# Patient Record
Sex: MALE | Race: WHITE | Employment: OTHER | URBAN - METROPOLITAN AREA
[De-identification: names, ages, dates, MRNs, and addresses within clinical notes are randomized per-mention and may not be internally consistent; named-entity substitution may affect disease eponyms.]

---

## 2019-11-04 ENCOUNTER — HOSPITAL ENCOUNTER (OUTPATIENT)
Dept: GENERAL RADIOLOGY | Age: 79
Discharge: HOME | End: 2019-11-04
Payer: MEDICARE

## 2019-11-04 ENCOUNTER — HOSPITAL ENCOUNTER (OUTPATIENT)
Age: 79
Discharge: HOME | End: 2019-11-04
Payer: MEDICARE

## 2019-11-04 DIAGNOSIS — S62.341A CLOSED NONDISPLACED FRACTURE OF BASE OF SECOND METACARPAL BONE OF LEFT HAND, INITIAL ENCOUNTER: ICD-10-CM

## 2019-11-04 PROCEDURE — 73130 X-RAY EXAM OF HAND: CPT

## 2022-10-26 ENCOUNTER — HOSPITAL ENCOUNTER (EMERGENCY)
Age: 82
Discharge: HOME | End: 2022-10-26
Attending: EMERGENCY MEDICINE
Payer: MEDICARE

## 2022-10-26 VITALS
SYSTOLIC BLOOD PRESSURE: 152 MMHG | BODY MASS INDEX: 27.92 KG/M2 | HEIGHT: 70 IN | OXYGEN SATURATION: 97 % | RESPIRATION RATE: 16 BRPM | HEART RATE: 72 BPM | WEIGHT: 195 LBS | DIASTOLIC BLOOD PRESSURE: 79 MMHG

## 2022-10-26 DIAGNOSIS — S05.01XA ABRASION OF RIGHT CORNEA, INITIAL ENCOUNTER: Primary | ICD-10-CM

## 2022-10-26 PROCEDURE — 99283 EMERGENCY DEPT VISIT LOW MDM: CPT

## 2022-10-26 PROCEDURE — 6370000000 HC RX 637 (ALT 250 FOR IP): Performed by: EMERGENCY MEDICINE

## 2022-10-26 RX ORDER — OMEGA-3S/DHA/EPA/FISH OIL/D3 300MG-1000
1000 CAPSULE ORAL DAILY
COMMUNITY

## 2022-10-26 RX ORDER — LISINOPRIL 20 MG/1
TABLET ORAL
COMMUNITY

## 2022-10-26 RX ORDER — AMLODIPINE BESYLATE 10 MG/1
10 TABLET ORAL DAILY
COMMUNITY
Start: 2020-09-11

## 2022-10-26 RX ORDER — GLIPIZIDE 10 MG/1
TABLET ORAL
COMMUNITY
Start: 2022-10-24

## 2022-10-26 RX ORDER — ALBUTEROL SULFATE 90 UG/1
AEROSOL, METERED RESPIRATORY (INHALATION)
COMMUNITY

## 2022-10-26 RX ORDER — TETRACAINE HYDROCHLORIDE 5 MG/ML
2 SOLUTION OPHTHALMIC ONCE
Status: COMPLETED | OUTPATIENT
Start: 2022-10-26 | End: 2022-10-26

## 2022-10-26 RX ORDER — SIMVASTATIN 20 MG
TABLET ORAL
COMMUNITY

## 2022-10-26 RX ORDER — FUROSEMIDE 40 MG/1
TABLET ORAL
COMMUNITY
Start: 2022-09-03

## 2022-10-26 RX ORDER — VALSARTAN 160 MG/1
TABLET ORAL
COMMUNITY

## 2022-10-26 RX ORDER — ASPIRIN 81 MG/1
TABLET ORAL
COMMUNITY

## 2022-10-26 RX ORDER — SOTALOL HYDROCHLORIDE 80 MG/1
TABLET ORAL
COMMUNITY

## 2022-10-26 RX ORDER — WARFARIN SODIUM 5 MG/1
TABLET ORAL
COMMUNITY
Start: 2021-10-07

## 2022-10-26 RX ORDER — NITROGLYCERIN 0.4 MG/1
0.4 TABLET SUBLINGUAL PRN
COMMUNITY
Start: 2018-07-17

## 2022-10-26 RX ORDER — FLUTICASONE PROPIONATE 50 MCG
SPRAY, SUSPENSION (ML) NASAL
COMMUNITY

## 2022-10-26 RX ORDER — FUROSEMIDE 40 MG/1
TABLET ORAL
COMMUNITY
Start: 2021-09-20

## 2022-10-26 RX ORDER — GABAPENTIN 100 MG/1
CAPSULE ORAL
COMMUNITY

## 2022-10-26 RX ORDER — METFORMIN HYDROCHLORIDE 500 MG/1
1000 TABLET, EXTENDED RELEASE ORAL
COMMUNITY

## 2022-10-26 RX ORDER — ERYTHROMYCIN 5 MG/G
OINTMENT OPHTHALMIC EVERY 6 HOURS
Qty: 1 G | Refills: 0 | Status: SHIPPED | OUTPATIENT
Start: 2022-10-26 | End: 2022-10-31

## 2022-10-26 RX ADMIN — TETRACAINE HYDROCHLORIDE 2 DROP: 5 SOLUTION OPHTHALMIC at 10:43

## 2022-10-26 NOTE — ED PROVIDER NOTES
Triage Chief Complaint:   Foreign Body (Rt eye from blowing leaves x 2 days ago)      [place]:  Mishel Brownlee is a 78 y.o. male that presents to the emergency department with concerns for foreign body in his right eye. He states that 2 days ago he was raking leaves and blowing leaves when he believes that something got into his right eye. He wears glasses but does not wear contacts. He denies blurry vision. He states his right eye is watering at times. He was able to drive himself here. He states it feels like every time he closes his eye there is something in there. .    Past Medical History:   Diagnosis Date    Diabetes mellitus ([facility] [address]     Hyperlipidemia     Hypertension      No past surgical history on file. No family history on file.   Social History     Socioeconomic History    Marital status:      Spouse name: Not on file    Number of children: Not on file    Years of education: Not on file    Highest education level: Not on file   Occupational History    Not on file   Tobacco Use    Smoking status: Never    Smokeless tobacco: Not on file   Substance and Sexual Activity    Alcohol use: Not Currently    Drug use: Not on file    Sexual activity: Not on file   Other Topics Concern    Not on file   Social History Narrative    Not on file     Social Determinants of Health     Financial Resource Strain: Not on file   Food Insecurity: Not on file   Transportation Needs: Not on file   Physical Activity: Not on file   Stress: Not on file   Social Connections: Not on file   Intimate Partner Violence: Not on file   Housing Stability: Not on file     Current Facility-Administered Medications   Medication Dose Route Frequency Provider Last Rate Last Admin    tetracaine (TETRAVISC) 0.5 % ophthalmic solution 2 drop  2 drop Left Eye Once Kirt Wilhelm MD        fluorescein ophthalmic strip 1 mg  1 strip Left Eye Once Kirt Wilhelm MD         Current Outpatient Medications   Medication Sig Dispense Refill amLODIPine (NORVASC) 10 MG tablet Take 10 mg by mouth daily      furosemide (LASIX) 40 MG tablet furosemide 40 mg tablet   TAKE 1 TABLET BY MOUTH EVERY DAY      glipiZIDE (GLUCOTROL) 10 MG tablet glipizide 10 mg tablet   Take 0.5 tablets every day by oral route. nitroGLYCERIN (NITROSTAT) 0.4 MG SL tablet Place 0.4 mg under the tongue as needed      warfarin (COUMADIN) 5 MG tablet warfarin 5 mg tablet   Sunday, tues, thur, saturday      erythromycin (ROMYCIN) 5 MG/GM ophthalmic ointment Place into the right eye every 6 hours for 5 days 1 g 0    CPAP Machine MISC nightly      Multiple Vitamins-Minerals (MULTIVITAMIN ADULT EXTRA C PO) multivitamin   TAKE ONE TABLET BY MOUTH DAILY      albuterol sulfate HFA (VENTOLIN HFA) 108 (90 Base) MCG/ACT inhaler Ventolin HFA 90 mcg/actuation aerosol inhaler      aspirin 81 MG EC tablet aspirin 81 mg tablet,delayed release   Take 1 tablet every day by oral route. vitamin D3 (CHOLECALCIFEROL) 10 MCG (400 UNIT) TABS tablet Take 1,000 Units by mouth daily      cyanocobalamin 1000 MCG tablet cyanocobalamin (vit B-12) 1,000 mcg tablet   Take 1 tablet every day by oral route. empagliflozin (JARDIANCE) 10 MG tablet Jardiance 10 mg tablet   Take 1 tablet every day by oral route for 90 days. fluticasone (FLONASE) 50 MCG/ACT nasal spray fluticasone propionate 50 mcg/actuation nasal spray,suspension      furosemide (LASIX) 40 MG tablet TAKE 1 TABLET BY MOUTH EVERY DAY      gabapentin (NEURONTIN) 100 MG capsule gabapentin 100 mg capsule   TAKE 2 CAPSULES EVERY DAY BY ORAL ROUTE IN THE MORNING FOR 90 DAYS.       lisinopril (PRINIVIL;ZESTRIL) 20 MG tablet lisinopril 20 mg tablet      metFORMIN (GLUCOPHAGE-XR) 500 MG extended release tablet Take 1,000 mg by mouth every morning (before breakfast)      simvastatin (ZOCOR) 20 MG tablet simvastatin 20 mg tablet   TAKE 1 TABLET 3 TIMES A WEEK BY ORAL ROUTE FOR 90 DAYS      sotalol (BETAPACE) 80 MG tablet sotalol 80 mg tablet valsartan (DIOVAN) 160 MG tablet valsartan 160 mg tablet   Take 1 tablet every day by oral route. No Known Allergies  Nursing Notes Reviewed    ROS:  At least 5 systems reviewed and otherwise negative except as in the [address] Physical Exam:  ED Triage Vitals [10/26/22 1022]   Enc Vitals Group      BP (!) 152/79      Heart Rate 72      Resp 16      Temp       Temp Source Infrared      SpO2 97 %      Weight 195 lb (88.5 kg)      Height 5' 10\" (1.778 m)      Head Circumference       Peak Flow       Pain Score       Pain Loc       Pain Edu? Excl. in [address]? My pulse oximetry interpretation is which is within the normal range    GENERAL APPEARANCE: Awake and alert. Cooperative. No acute distress. HEAD:  Atraumatic. EYES: EOM's grossly intact. Peoples equal round reactive to light. No hyphema. No corneal lacerations. Under fluorescein exam approximate 1 mm corneal abrasion at the 7 o'clock position. ENT: Mucous membranes are moist.  No trismus. NECK:  Trachea midline. EXTREMITIES: No acute deformities. SKIN: Warm and dry. NEUROLOGICAL: No gross facial drooping. Moves all 4 extremities spontaneously. PSYCHIATRIC: Normal mood. I have reviewed and interpreted all of the currently available lab results from this visit (if applicable):  No results found for this visit on 10/26/22. EKG: (All EKG's are interpreted by myself in the absence of a cardiologist)      MDM:  Patient appears to have a superficial corneal abrasion in his right affected eye. His vision is intact. I will start him on erythromycin ointment. He is instructed to use Tylenol over-the-counter as needed if tolerated. Instructed to follow-up with PCP as needed. Given strict return precautions. Clinical Impression:  1.  Abrasion of right cornea, initial encounter        Disposition Vitals:  [unfilled], [unfilled], [unfilled], [unfilled]    Disposition referral (if applicable):  [facility] [id number]  [phone]    Schedule an appointment as soon as possible for a visit   If symptoms worsen    Disposition medications (if applicable):  New Prescriptions    ERYTHROMYCIN (ROMYCIN) 5 MG/GM OPHTHALMIC OINTMENT    Place into the right eye every 6 hours for 5 days         (Please note that portions of this note may have been completed with a voice recognition program. Efforts were made to edit the dictations but occasionally words are mis-transcribed.)    Lucile Gosselin, MD Daisey Stade, MD  10/26/22 3912

## 2022-10-26 NOTE — ED NOTES
Instructions given how to instill ointment into rt eye, pt verbalized understanding.       Carlos Jacobs RN  10/26/22 [id number]

## 2023-04-18 ENCOUNTER — HOSPITAL ENCOUNTER (EMERGENCY)
Age: 83
Discharge: HOME | End: 2023-04-18
Attending: EMERGENCY MEDICINE
Payer: MEDICARE

## 2023-04-18 ENCOUNTER — APPOINTMENT (OUTPATIENT)
Dept: GENERAL RADIOLOGY | Age: 83
End: 2023-04-18
Payer: MEDICARE

## 2023-04-18 VITALS
TEMPERATURE: 97 F | SYSTOLIC BLOOD PRESSURE: 166 MMHG | WEIGHT: 217 LBS | RESPIRATION RATE: 22 BRPM | BODY MASS INDEX: 31.07 KG/M2 | DIASTOLIC BLOOD PRESSURE: 88 MMHG | HEIGHT: 70 IN | OXYGEN SATURATION: 91 % | HEART RATE: 77 BPM

## 2023-04-18 DIAGNOSIS — J40 BRONCHITIS: Primary | ICD-10-CM

## 2023-04-18 LAB
ALBUMIN SERPL-MCNC: 4.1 GM/DL (ref 3.4–5)
ALP BLD-CCNC: 95 IU/L (ref 40–129)
ALT SERPL-CCNC: 9 U/L (ref 10–40)
ANION GAP SERPL CALCULATED.3IONS-SCNC: 9 MMOL/L (ref 4–16)
AST SERPL-CCNC: 17 IU/L (ref 15–37)
BASOPHILS ABSOLUTE: 0 K/CU MM
BASOPHILS RELATIVE PERCENT: 0.3 % (ref 0–1)
BILIRUB SERPL-MCNC: 0.6 MG/DL (ref 0–1)
BUN SERPL-MCNC: 25 MG/DL (ref 6–23)
CALCIUM SERPL-MCNC: 9.4 MG/DL (ref 8.3–10.6)
CHLORIDE BLD-SCNC: 107 MMOL/L (ref 99–110)
CO2: 28 MMOL/L (ref 21–32)
CREAT SERPL-MCNC: 0.8 MG/DL (ref 0.9–1.3)
DIFFERENTIAL TYPE: ABNORMAL
EOSINOPHILS ABSOLUTE: 0.5 K/CU MM
EOSINOPHILS RELATIVE PERCENT: 6.4 % (ref 0–3)
GFR SERPL CREATININE-BSD FRML MDRD: >60 ML/MIN/1.73M2
GLUCOSE SERPL-MCNC: 166 MG/DL (ref 70–99)
HCT VFR BLD CALC: 40.3 % (ref 42–52)
HEMOGLOBIN: 12.6 GM/DL (ref 13.5–18)
IMMATURE NEUTROPHIL %: 0.4 % (ref 0–0.43)
LYMPHOCYTES ABSOLUTE: 0.8 K/CU MM
LYMPHOCYTES RELATIVE PERCENT: 9.6 % (ref 24–44)
MCH RBC QN AUTO: 30.4 PG (ref 27–31)
MCHC RBC AUTO-ENTMCNC: 31.3 % (ref 32–36)
MCV RBC AUTO: 97.1 FL (ref 78–100)
MONOCYTES ABSOLUTE: 0.7 K/CU MM
MONOCYTES RELATIVE PERCENT: 9 % (ref 0–4)
PDW BLD-RTO: 13.5 % (ref 11.7–14.9)
PLATELET # BLD: 151 K/CU MM (ref 140–440)
PMV BLD AUTO: 10.7 FL (ref 7.5–11.1)
POTASSIUM SERPL-SCNC: 3.9 MMOL/L (ref 3.5–5.1)
PRO-BNP: 2709 PG/ML
RBC # BLD: 4.15 M/CU MM (ref 4.6–6.2)
SARS-COV-2 RDRP RESP QL NAA+PROBE: NOT DETECTED
SEGMENTED NEUTROPHILS ABSOLUTE COUNT: 5.9 K/CU MM
SEGMENTED NEUTROPHILS RELATIVE PERCENT: 74.3 % (ref 36–66)
SODIUM BLD-SCNC: 144 MMOL/L (ref 135–145)
SOURCE: NORMAL
TOTAL IMMATURE NEUTOROPHIL: 0.03 K/CU MM
TOTAL PROTEIN: 7 GM/DL (ref 6.4–8.2)
TROPONIN T: <0.01 NG/ML
WBC # BLD: 7.9 K/CU MM (ref 4–10.5)

## 2023-04-18 PROCEDURE — 84484 ASSAY OF TROPONIN QUANT: CPT

## 2023-04-18 PROCEDURE — 85025 COMPLETE CBC W/AUTO DIFF WBC: CPT

## 2023-04-18 PROCEDURE — 80053 COMPREHEN METABOLIC PANEL: CPT

## 2023-04-18 PROCEDURE — 99285 EMERGENCY DEPT VISIT HI MDM: CPT

## 2023-04-18 PROCEDURE — 93005 ELECTROCARDIOGRAM TRACING: CPT | Performed by: EMERGENCY MEDICINE

## 2023-04-18 PROCEDURE — 87635 SARS-COV-2 COVID-19 AMP PRB: CPT

## 2023-04-18 PROCEDURE — 71045 X-RAY EXAM CHEST 1 VIEW: CPT

## 2023-04-18 PROCEDURE — 83880 ASSAY OF NATRIURETIC PEPTIDE: CPT

## 2023-04-18 PROCEDURE — 6370000000 HC RX 637 (ALT 250 FOR IP): Performed by: EMERGENCY MEDICINE

## 2023-04-18 RX ORDER — PREDNISONE 10 MG/1
40 TABLET ORAL DAILY
Qty: 16 TABLET | Refills: 0 | Status: SHIPPED | OUTPATIENT
Start: 2023-04-19 | End: 2023-04-23

## 2023-04-18 RX ORDER — PREDNISONE 20 MG/1
60 TABLET ORAL ONCE
Status: COMPLETED | OUTPATIENT
Start: 2023-04-18 | End: 2023-04-18

## 2023-04-18 RX ORDER — ALBUTEROL SULFATE 90 UG/1
2 AEROSOL, METERED RESPIRATORY (INHALATION) ONCE
Status: COMPLETED | OUTPATIENT
Start: 2023-04-18 | End: 2023-04-18

## 2023-04-18 RX ORDER — ALBUTEROL SULFATE 90 UG/1
2 AEROSOL, METERED RESPIRATORY (INHALATION) 4 TIMES DAILY PRN
Qty: 54 G | Refills: 1 | Status: SHIPPED | OUTPATIENT
Start: 2023-04-18

## 2023-04-18 RX ADMIN — ALBUTEROL SULFATE 2 PUFF: 90 AEROSOL, METERED RESPIRATORY (INHALATION) at 11:32

## 2023-04-18 RX ADMIN — PREDNISONE 60 MG: 20 TABLET ORAL at 11:32

## 2023-04-18 ASSESSMENT — PAIN - FUNCTIONAL ASSESSMENT: PAIN_FUNCTIONAL_ASSESSMENT: NONE - DENIES PAIN

## 2023-04-18 NOTE — ED PROVIDER NOTES
Emergency Department Encounter    Patient: Shereen Hsieh  MRN: [id number]  : 1940  Date of Evaluation: 2023  ED Provider:  Marvin Leyva MD    Triage Chief Complaint:   Cough (Reports 2 days reports pharyngitis), Wheezing, and Shortness of Breath (Reports on exertion.)    [place]:  Shereen Hsieh is a 78 y.o. male that presents with complaint of cough and wheezing, shortness of breath is primarily with exertion today. Started with sore throat and congestion on  and has progressed over the last 2 days. No chest pain. No abdominal pain. No vomiting or diarrhea. He has used inhaler when he was been sick a long time ago when he had pneumonia, otherwise does not use inhalers. He has not required steroids frequently in the past.  He does not have any history of COPD or lung disease that he is aware of. He is a history of hyperlipidemia, hypertension and diabetes. No previous known cardiac disease. No leg swelling. ROS - see HPI, below listed is current ROS at time of my eval:  10 systems reviewed and negative except as above. Past Medical History:   Diagnosis Date    Diabetes mellitus ([facility] [address]     Hyperlipidemia     Hypertension      Past Surgical History:   Procedure Laterality Date    HERNIA REPAIR       History reviewed. No pertinent family history.   Social History     Socioeconomic History    Marital status:      Spouse name: Not on file    Number of children: Not on file    Years of education: Not on file    Highest education level: Not on file   Occupational History    Not on file   Tobacco Use    Smoking status: Never    Smokeless tobacco: Not on file   Substance and Sexual Activity    Alcohol use: Not Currently    Drug use: Never    Sexual activity: Not on file   Other Topics Concern    Not on file   Social History Narrative    Not on file     Social Determinants of Health     Financial Resource Strain: Not on file   Food Insecurity: Not on file   Transportation Needs: Not

## 2023-04-19 LAB
EKG ATRIAL RATE: 357 BPM
EKG DIAGNOSIS: NORMAL
EKG Q-T INTERVAL: 454 MS
EKG QRS DURATION: 170 MS
EKG QTC CALCULATION (BAZETT): 497 MS
EKG R AXIS: -66 DEGREES
EKG T AXIS: 108 DEGREES
EKG VENTRICULAR RATE: 72 BPM

## 2023-04-19 PROCEDURE — 93010 ELECTROCARDIOGRAM REPORT: CPT | Performed by: INTERNAL MEDICINE

## 2023-04-24 ENCOUNTER — HOSPITAL ENCOUNTER (EMERGENCY)
Age: 83
Discharge: OTHER INSTITUTION | End: 2023-04-24
Attending: EMERGENCY MEDICINE
Payer: MEDICARE

## 2023-04-24 ENCOUNTER — APPOINTMENT (OUTPATIENT)
Dept: GENERAL RADIOLOGY | Age: 83
End: 2023-04-24
Payer: MEDICARE

## 2023-04-24 VITALS
HEART RATE: 92 BPM | RESPIRATION RATE: 20 BRPM | DIASTOLIC BLOOD PRESSURE: 76 MMHG | SYSTOLIC BLOOD PRESSURE: 160 MMHG | TEMPERATURE: 98 F | OXYGEN SATURATION: 89 % | HEIGHT: 70 IN | BODY MASS INDEX: 31.07 KG/M2 | WEIGHT: 217 LBS

## 2023-04-24 DIAGNOSIS — J18.9 PNEUMONIA OF BOTH LOWER LOBES DUE TO INFECTIOUS ORGANISM: ICD-10-CM

## 2023-04-24 DIAGNOSIS — I50.9 ACUTE CONGESTIVE HEART FAILURE, UNSPECIFIED HEART FAILURE TYPE (HCC): Primary | ICD-10-CM

## 2023-04-24 LAB
ALBUMIN SERPL-MCNC: 3.6 GM/DL (ref 3.4–5)
ALP BLD-CCNC: 79 IU/L (ref 40–129)
ALT SERPL-CCNC: 21 U/L (ref 10–40)
ANION GAP SERPL CALCULATED.3IONS-SCNC: 7 MMOL/L (ref 4–16)
AST SERPL-CCNC: 14 IU/L (ref 15–37)
BASOPHILS ABSOLUTE: 0 K/CU MM
BASOPHILS RELATIVE PERCENT: 0.1 % (ref 0–1)
BILIRUB SERPL-MCNC: 0.8 MG/DL (ref 0–1)
BUN SERPL-MCNC: 31 MG/DL (ref 6–23)
CALCIUM SERPL-MCNC: 9 MG/DL (ref 8.3–10.6)
CHLORIDE BLD-SCNC: 104 MMOL/L (ref 99–110)
CO2: 32 MMOL/L (ref 21–32)
CREAT SERPL-MCNC: 1 MG/DL (ref 0.9–1.3)
DIFFERENTIAL TYPE: ABNORMAL
EOSINOPHILS ABSOLUTE: 0 K/CU MM
EOSINOPHILS RELATIVE PERCENT: 0.3 % (ref 0–3)
GFR SERPL CREATININE-BSD FRML MDRD: >60 ML/MIN/1.73M2
GLUCOSE SERPL-MCNC: 286 MG/DL (ref 70–99)
HCT VFR BLD CALC: 36.7 % (ref 42–52)
HEMOGLOBIN: 11.5 GM/DL (ref 13.5–18)
IMMATURE NEUTROPHIL %: 0.5 % (ref 0–0.43)
LYMPHOCYTES ABSOLUTE: 1 K/CU MM
LYMPHOCYTES RELATIVE PERCENT: 7.4 % (ref 24–44)
MAGNESIUM: 1.7 MG/DL (ref 1.8–2.4)
MCH RBC QN AUTO: 30.2 PG (ref 27–31)
MCHC RBC AUTO-ENTMCNC: 31.3 % (ref 32–36)
MCV RBC AUTO: 96.3 FL (ref 78–100)
MONOCYTES ABSOLUTE: 1.2 K/CU MM
MONOCYTES RELATIVE PERCENT: 8.7 % (ref 0–4)
PDW BLD-RTO: 13.6 % (ref 11.7–14.9)
PLATELET # BLD: 170 K/CU MM (ref 140–440)
PMV BLD AUTO: 10.4 FL (ref 7.5–11.1)
POTASSIUM SERPL-SCNC: 4 MMOL/L (ref 3.5–5.1)
PRO-BNP: 3529 PG/ML
RBC # BLD: 3.81 M/CU MM (ref 4.6–6.2)
SEGMENTED NEUTROPHILS ABSOLUTE COUNT: 11.5 K/CU MM
SEGMENTED NEUTROPHILS RELATIVE PERCENT: 83 % (ref 36–66)
SODIUM BLD-SCNC: 143 MMOL/L (ref 135–145)
TOTAL IMMATURE NEUTOROPHIL: 0.07 K/CU MM
TOTAL PROTEIN: 6.1 GM/DL (ref 6.4–8.2)
TROPONIN T: <0.01 NG/ML
WBC # BLD: 13.8 K/CU MM (ref 4–10.5)

## 2023-04-24 PROCEDURE — 6360000002 HC RX W HCPCS: Performed by: EMERGENCY MEDICINE

## 2023-04-24 PROCEDURE — 96367 TX/PROPH/DG ADDL SEQ IV INF: CPT

## 2023-04-24 PROCEDURE — 2580000003 HC RX 258: Performed by: EMERGENCY MEDICINE

## 2023-04-24 PROCEDURE — 84484 ASSAY OF TROPONIN QUANT: CPT

## 2023-04-24 PROCEDURE — 85025 COMPLETE CBC W/AUTO DIFF WBC: CPT

## 2023-04-24 PROCEDURE — 87040 BLOOD CULTURE FOR BACTERIA: CPT

## 2023-04-24 PROCEDURE — 83735 ASSAY OF MAGNESIUM: CPT

## 2023-04-24 PROCEDURE — 83880 ASSAY OF NATRIURETIC PEPTIDE: CPT

## 2023-04-24 PROCEDURE — 80053 COMPREHEN METABOLIC PANEL: CPT

## 2023-04-24 PROCEDURE — 96375 TX/PRO/DX INJ NEW DRUG ADDON: CPT

## 2023-04-24 PROCEDURE — 71045 X-RAY EXAM CHEST 1 VIEW: CPT

## 2023-04-24 PROCEDURE — 99285 EMERGENCY DEPT VISIT HI MDM: CPT

## 2023-04-24 PROCEDURE — 93005 ELECTROCARDIOGRAM TRACING: CPT | Performed by: EMERGENCY MEDICINE

## 2023-04-24 PROCEDURE — 96365 THER/PROPH/DIAG IV INF INIT: CPT

## 2023-04-24 RX ORDER — FUROSEMIDE 10 MG/ML
40 INJECTION INTRAMUSCULAR; INTRAVENOUS ONCE
Status: COMPLETED | OUTPATIENT
Start: 2023-04-24 | End: 2023-04-24

## 2023-04-24 RX ADMIN — AZITHROMYCIN MONOHYDRATE 500 MG: 500 INJECTION, POWDER, LYOPHILIZED, FOR SOLUTION INTRAVENOUS at 15:08

## 2023-04-24 RX ADMIN — FUROSEMIDE 40 MG: 10 INJECTION, SOLUTION INTRAVENOUS at 15:01

## 2023-04-24 RX ADMIN — CEFTRIAXONE SODIUM 1000 MG: 1 INJECTION, POWDER, FOR SOLUTION INTRAMUSCULAR; INTRAVENOUS at 16:22

## 2023-04-24 ASSESSMENT — PAIN - FUNCTIONAL ASSESSMENT: PAIN_FUNCTIONAL_ASSESSMENT: NONE - DENIES PAIN

## 2023-04-24 NOTE — ED NOTES
The patient presents to the er today with complaints of a cough and chest congestion for a week. He reports of being here last week for the same. He reports that he did get better initially but then his symptoms came back. The family is at the bedside and the call light is within reach.                  Sandeep Joseph RN  04/24/23 6424

## 2023-04-24 NOTE — ED PROVIDER NOTES
Laterality Date    HERNIA REPAIR       History reviewed. No pertinent family history.   Social History     Socioeconomic History    Marital status:      Spouse name: Not on file    Number of children: Not on file    Years of education: Not on file    Highest education level: Not on file   Occupational History    Not on file   Tobacco Use    Smoking status: Never    Smokeless tobacco: Not on file   Substance and Sexual Activity    Alcohol use: Not Currently    Drug use: Never    Sexual activity: Not on file   Other Topics Concern    Not on file   Social History Narrative    Not on file     Social Determinants of Health     Financial Resource Strain: Not on file   Food Insecurity: Not on file   Transportation Needs: Not on file   Physical Activity: Not on file   Stress: Not on file   Social Connections: Not on file   Intimate Partner Violence: Not on file   Housing Stability: Not on file     Current Facility-Administered Medications   Medication Dose Route Frequency Provider Last Rate Last Admin    azithromycin (ZITHROMAX) 500 mg in sodium chloride 0.9 % 250 mL IVPB ([id number])  500 mg IntraVENous Once Alicia Patel, DO        cefTRIAXone (ROCEPHIN) 1,000 mg in sodium chloride 0.9 % 50 mL IVPB (mini-bag)  1,000 mg IntraVENous Once Alicia Patel, DO        furosemide (LASIX) injection 40 mg  40 mg IntraVENous Once Alicia Rey Oz, DO         Current Outpatient Medications   Medication Sig Dispense Refill    albuterol sulfate HFA (VENTOLIN HFA) 108 (90 Base) MCG/ACT inhaler Inhale 2 puffs into the lungs 4 times daily as needed for Wheezing 54 g 1    Spacer/Aero-Holding Chambers MAIK 1 Device by Does not apply route daily as needed (for use with inhaler) 1 each 0    CPAP Machine MISC nightly      Multiple Vitamins-Minerals (MULTIVITAMIN ADULT EXTRA C PO) multivitamin   TAKE ONE TABLET BY MOUTH DAILY      albuterol sulfate HFA (PROVENTIL;VENTOLIN;PROAIR) 108 (90 Base) MCG/ACT inhaler Ventolin HFA 90

## 2023-04-24 NOTE — ED NOTES
The patient was started on 2 liters of oxygen by cannula as the saturations have dropped to 89%.          Sandeep Joseph RN  04/24/23 6340

## 2023-04-24 NOTE — ED NOTES
Report was called to Jorge at [facility]. The patient was alert and oriented at the time of transfer. The daughter is at the bedside and will follow to [facility].       Guanaco Singer, NICOLAS  04/24/23 [id number]

## 2023-04-24 NOTE — ED NOTES
The patient was given a urinal so he can void at the bedside. The wife is at the bedside and the call light is within reach.                Guanaco Singer, NICOLAS  04/24/23 1131

## 2023-04-25 LAB
EKG ATRIAL RATE: 67 BPM
EKG DIAGNOSIS: NORMAL
EKG P AXIS: 42 DEGREES
EKG Q-T INTERVAL: 456 MS
EKG QRS DURATION: 170 MS
EKG QTC CALCULATION (BAZETT): 492 MS
EKG R AXIS: -70 DEGREES
EKG T AXIS: 103 DEGREES
EKG VENTRICULAR RATE: 70 BPM

## 2023-04-25 PROCEDURE — 93010 ELECTROCARDIOGRAM REPORT: CPT | Performed by: INTERNAL MEDICINE

## 2023-04-29 LAB
CULTURE: NORMAL
CULTURE: NORMAL
Lab: NORMAL
Lab: NORMAL
SPECIMEN: NORMAL
SPECIMEN: NORMAL